# Patient Record
Sex: MALE | Race: WHITE | URBAN - METROPOLITAN AREA
[De-identification: names, ages, dates, MRNs, and addresses within clinical notes are randomized per-mention and may not be internally consistent; named-entity substitution may affect disease eponyms.]

---

## 2021-06-21 ENCOUNTER — OFFICE VISIT (OUTPATIENT)
Dept: ORTHOPEDIC SURGERY | Age: 66
End: 2021-06-21
Payer: COMMERCIAL

## 2021-06-21 VITALS — BODY MASS INDEX: 34.36 KG/M2 | HEIGHT: 70 IN | WEIGHT: 240 LBS

## 2021-06-21 DIAGNOSIS — S81.802A WOUND OF LEFT LOWER EXTREMITY, INITIAL ENCOUNTER: ICD-10-CM

## 2021-06-21 DIAGNOSIS — Z89.512 HX OF BKA, LEFT (HCC): Primary | ICD-10-CM

## 2021-06-21 PROCEDURE — 99205 OFFICE O/P NEW HI 60 MIN: CPT | Performed by: PHYSICAL MEDICINE & REHABILITATION

## 2021-06-21 RX ORDER — INSULIN GLARGINE 100 [IU]/ML
INJECTION, SOLUTION SUBCUTANEOUS NIGHTLY
COMMUNITY
Start: 2021-06-04

## 2021-06-21 RX ORDER — GABAPENTIN 600 MG/1
100 TABLET ORAL 2 TIMES DAILY
COMMUNITY
Start: 2021-06-05

## 2021-06-21 RX ORDER — ASPIRIN 81 MG/1
81 TABLET ORAL DAILY
COMMUNITY

## 2021-06-21 RX ORDER — ATORVASTATIN CALCIUM 80 MG/1
TABLET, FILM COATED ORAL
COMMUNITY
Start: 2020-06-30

## 2021-06-21 RX ORDER — CASTOR OIL AND BALSAM, PERU 788; 87 MG/G; MG/G
OINTMENT TOPICAL 2 TIMES DAILY
Qty: 60 G | Refills: 2 | Status: SHIPPED | OUTPATIENT
Start: 2021-06-21

## 2021-06-21 RX ORDER — DIPHENHYDRAMINE HYDROCHLORIDE 25 MG/1
1 TABLET ORAL DAILY
COMMUNITY

## 2021-06-21 RX ORDER — LISINOPRIL AND HYDROCHLOROTHIAZIDE 12.5; 1 MG/1; MG/1
2 TABLET ORAL DAILY
COMMUNITY
Start: 2021-06-16

## 2021-06-21 NOTE — PROGRESS NOTES
[facility]) Physical Medicine and Rehabilitation   Outpatient Progress Note  Arsh Angel. DO Karen, MPH    Patient Name: Jaiden Lentz MRN: [id number]   Age: 76 y.o. YOB: 1955   Sex: male      CHIEF COMPLAINT   Left transtibial amputation      HISTORY OF PRESENT ILLNESS   Jaiden Lentz is a 76 y.o. male with a past medical history of DM2, MI, obesity and OA who comes in today for evaluation of a left BKA. He had surgery on 5/17/21 due to infection. Since this time he has been working in therapy and attempting to perform ADLs at home but requires a new prosthetic. Previous to his BKA he was at a K3 level and able to cook, clean and perform ADLs by himself. He also enjoyed walking but had to use a cane on occasion. Currently he is able to cook and clean and perform independent bathing/toileting but is using a wheelchair at home. He would like to start ambulating and getting around his home better to continue to work in his basement. At this time He just wants to ambulate and continue to work in therapy to get back to normal life. He did state that he fell today and has some blood drainage from his wound. PAST MEDICAL HISTORY    No past medical history on file. PAST SURGICAL HISTORY   No past surgical history on file. MEDICATIONS     Current Outpatient Medications   Medication Sig Dispense Refill    gabapentin (NEURONTIN) 600 MG tablet 100 mg 2 times daily.       Biotin 5 MG CAPS Take 1 capsule by mouth daily      lisinopril-hydroCHLOROthiazide (PRINZIDE;ZESTORETIC) 10-12.5 MG per tablet Take 2 tablets by mouth daily      atorvastatin (LIPITOR) 80 MG tablet TAKE 1 TABLET(80 MG) BY MOUTH AT BEDTIME      aspirin 81 MG EC tablet Take 81 mg by mouth daily      insulin glargine (LANTUS) 100 UNIT/ML injection vial Inject into the skin nightly      insulin lispro (HUMALOG) 100 UNIT/ML injection vial Inject 25-50 Units into the skin every morning (before breakfast)       No current facility-administered medications for this visit. ALLERGIES   No Known Allergies    FAMILY HISTORY   No family history on file. SOCIAL HISTORY     Social History     Socioeconomic History    Marital status:      Spouse name: Not on file    Number of children: Not on file    Years of education: Not on file    Highest education level: Not on file   Occupational History    Not on file   Tobacco Use    Smoking status: Not on file   Substance and Sexual Activity    Alcohol use: Not on file    Drug use: Not on file    Sexual activity: Not on file   Other Topics Concern    Not on file   Social History Narrative    Not on file     Social Determinants of Health     Financial Resource Strain:     Difficulty of Paying Living Expenses:    Food Insecurity:     Worried About Running Out of Food in the Last Year:     [address] in the Last Year:    Transportation Needs:     Lack of Transportation (Medical):      Lack of Transportation (Non-Medical):    Physical Activity:     Days of Exercise per Week:     Minutes of Exercise per Session:    Stress:     Feeling of Stress :    Social Connections:     Frequency of Communication with Friends and Family:     Frequency of Social Gatherings with Friends and Family:     Attends Anabaptist Services:     Active Member of Clubs or Organizations:     Attends Club or Organization Meetings:     Marital Status:    Intimate Partner Violence:     Fear of Current or Ex-Partner:     Emotionally Abused:     Physically Abused:     Sexually Abused:        REVIEW OF SYSTEMS   General: no fever, chills, night sweats, anorexia, malaise, fatigue, or weight change  Hematologic:  no unexplained bleeding or bruising  HEENT:   no nasal congestion, rhinorrhea, sore throat, or facial pain  Respiratory:  no cough, dyspnea, or chest pain  Cardiovascular:  no angina, RICE, PND, orthopnea, dependent edema, or palpitations  Gastrointestinal:  no nausea, vomiting, diarrhea, constipation, or abdominal pain  Genitourinary:  no urinary urgency, frequency, dysuria, or hematuria  Musculoskeletal: see HPI  Endocrine:  no heat or cold intolerance and no polyphagia, polydipsia, or polyuria  Skin:  no skin eruptions or changing lesions  Neurologic:  no focal weakness, numbness/tingling, tremor, or severe headache. See HPI. See HPI for pertinent positives. PHYSICAL EXAM   Vital Signs:   Vitals:    06/21/21 1235   Weight: 240 lb (108.9 kg)   Height: 5' 10\" (1.778 m)       General appearance: healthy, alert, no distress  Skin: Skin color, texture, turgor normal. No rashes or lesions  HEENT: atraumatic, normocephalic. PERRL  Respiratory: Unlabored breathing  Lymphatic: No adenopathy   Neuro: Alert and oriented, normal distal sensation, normal bilateral DTRs  Vascular: Normal distal capillary and distal pulses  Muskuloskeletal Exam: Right Knee Exam   Right knee exam is normal.    Muscle Strength   The patient has normal right knee strength. Other   Swelling: mild      Left Knee Exam     Muscle Strength   The patient has normal left knee strength. Other   Erythema: absent  Swelling: moderate    Comments:  BKA healing with lateral open drainage due to fall earlier in the day. 4mm in length in incision line. IMPRESSION     1. Hx of BKA, left ([address]         PLAN   I had a lengthy discussion with patient today regarding diagnosis and treatment options and recommendations. LEFT BKA    1. K2 level- able to ambulate at a fixed rolando. - requires gait training and a walker in PT/OT    2. Multiaxial rotation unit foot. - able to ambulate on uneven surfaces    3. Utralight materials minimize energy expenditure     4. Flexible inner socket with external frame- volume fluctuations in his residual limb. 5. Endoskeletal definitive prosthesis with an anatomical total surface bearing socket, suction with sealing sleeve suspension and alps sleeve. Suction pyramid valve. [facility] foot which has K2 level use. New prothesis as he is a new amputee. 6. Wound care- triple antibiotic ointment- change wound dressing BID. FOLLOWUP     Return in about 3 months (around 9/21/2021). No orders of the defined types were placed in this encounter. No orders of the defined types were placed in this encounter.       Patient was instructed on appropriate use of braces, participation in home exercise programs, healthy lifestyle choices and weight loss as appropriate     Alan Jones, DO

## 2021-06-28 ENCOUNTER — TELEPHONE (OUTPATIENT)
Dept: ORTHOPEDIC SURGERY | Age: 66
End: 2021-06-28

## 2021-06-28 NOTE — TELEPHONE ENCOUNTER
General Question     Subject:  LEFT LEG   Patient and /or Facility Request: PATIENT'S WIFE STATES SHE DOESN'T KNOW WHICH PRESCRIPTION'S SHE SHOULD BE USING BECAUSE PATIENT'S LEG IS GETTING [facility] NEEDS ASSISTANCE  Contact Number: [phone] [address] [facility]

## 2021-06-29 NOTE — TELEPHONE ENCOUNTER
Left message for patient at home to call his vascular surgeon per Dr. Rodrigo Mckeon for an evaluation on his leg.

## 2022-03-09 ENCOUNTER — OFFICE VISIT (OUTPATIENT)
Dept: ORTHOPEDIC SURGERY | Age: 67
End: 2022-03-09
Payer: COMMERCIAL

## 2022-03-09 VITALS — HEIGHT: 70 IN | BODY MASS INDEX: 35.79 KG/M2 | WEIGHT: 250 LBS

## 2022-03-09 DIAGNOSIS — Z89.512 HX OF BKA, LEFT (HCC): Primary | ICD-10-CM

## 2022-03-09 PROCEDURE — 99204 OFFICE O/P NEW MOD 45 MIN: CPT | Performed by: PHYSICAL MEDICINE & REHABILITATION

## 2022-03-09 NOTE — PROGRESS NOTES
[facility]) Physical Medicine and Rehabilitation  Outpatient Progress Note  Kwasi Bentley. DO Karen    Patient Name: Bethany Castano MRN: [id number]   Age: 78 y.o. YOB: 1955   Sex: male      CHIEF COMPLAINT   Left transtibial amputation     HISTORY OF PRESENT ILLNESS   Bethany Castano is a 78 y.o. male with a past medical history of DM2, MI, obesity and OA who comes in today for evaluation of a left BKA. He had surgery on 5/17/21 due to infection. He was last seen in the office for left K2 BKA prosthetic orders and had a new prosthetic made. This prosthetic did not fit very well and while he was testing it in the office he had a skin tear which then lead to a long infectious process. He has had 2 surgeries in the past 6 months to close and clear infection. Wound has now healed and he is looking to get a new prosthetic. Today he follows up because he needs new orders for a left BKA prosthetic. He is going with a new company to fabricate prosthetic- [employer] [address] [place]. Past note- Previous to his BKA he was at a K3 level and able to cook, clean and perform ADLs by himself. He also enjoyed walking but had to use a cane on occasion. Currently he is able to cook and clean and perform independent bathing/toileting but is using a wheelchair at home. He would like to start ambulating and getting around his home better to continue to work in his basement. At this time He just wants to ambulate and continue to work in therapy to get back to normal life. PAST MEDICAL HISTORY    No past medical history on file. PAST SURGICAL HISTORY   No past surgical history on file. MEDICATIONS     Current Outpatient Medications   Medication Sig Dispense Refill    gabapentin (NEURONTIN) 600 MG tablet 100 mg 2 times daily.       Biotin 5 MG CAPS Take 1 capsule by mouth daily      lisinopril-hydroCHLOROthiazide (PRINZIDE;ZESTORETIC) 10-12.5 MG per tablet Take 2 tablets by mouth daily      atorvastatin Club or Organization Meetings: Not on file    Marital Status: Not on file   Intimate Partner Violence:     Fear of Current or Ex-Partner: Not on file    Emotionally Abused: Not on file    Physically Abused: Not on file    Sexually Abused: Not on file   Housing Stability:     Unable to Pay for Housing in the Last Year: Not on file    Number of Dontae in the Last Year: Not on file    Unstable Housing in the Last Year: Not on file       REVIEW OF SYSTEMS   General: no fever, chills, night sweats, anorexia, malaise, fatigue, or weight change  Hematologic:  no unexplained bleeding or bruising  HEENT:   no nasal congestion, rhinorrhea, sore throat, or facial pain  Respiratory:  no cough, dyspnea, or chest pain  Cardiovascular:  no angina, RICE, PND, orthopnea, dependent edema, or palpitations  Gastrointestinal:  no nausea, vomiting, diarrhea, constipation, or abdominal pain  Genitourinary:  no urinary urgency, frequency, dysuria, or hematuria  Musculoskeletal: see HPI  Endocrine:  no heat or cold intolerance and no polyphagia, polydipsia, or polyuria  Skin:  no skin eruptions or changing lesions  Neurologic:  no focal weakness, numbness/tingling, tremor, or severe headache. See HPI. See HPI for pertinent positives. PHYSICAL EXAM   Vital Signs:   Vitals:    03/09/22 1300   Weight: 250 lb (113.4 kg)   Height: 5' 10\" (1.778 m)       General appearance: healthy, alert, no distress  Skin: Skin color, texture, turgor normal. No rashes or lesions  HEENT: atraumatic, normocephalic.  PERRL  Respiratory: Unlabored breathing  Lymphatic: No adenopathy   Neuro: Alert and oriented, normal distal sensation, normal bilateral DTRs  Vascular: Normal distal capillary and distal pulses  Muskuloskeletal Exam: Right Knee Exam   Right knee exam is normal.     Muscle Strength   The patient has normal right knee strength.     Other   Swelling: mild        Left Knee Exam      Muscle Strength   The patient has normal left knee strength.     Other   Erythema: absent  Swelling: moderate     Comments:  BKA LEFT- no open wounds- healing well. IMPRESSION     1. Hx of BKA, left ([address]         PLAN   I had a lengthy discussion with patient today regarding diagnosis and treatment options and recommendations. 1.LEFT BKA- requires new prosthesis- Past attempts failed ( currently does not have a prosthesis) and requires new prosthesis due to wound care issues and multiple surgeries. TT definitive prosthesis left- motivated to continue to be able to ambulate for his independent lifestyle and to maintain his health. Has the cognitive ability to maintain and don his prosthesis.      1. K2 level- able to ambulate at a fixed rolando. - requires gait training and a walker in PT/OT     2. Multiaxial rotation unit foot. - able to ambulate on uneven surfaces     3. Utralight materials minimize energy expenditure      4. Flexible inner socket with external frame- volume fluctuations in his residual limb.      5. Endoskeletal definitive prosthesis with an anatomical total surface bearing socket, suction with sealing sleeve suspension and alps sleeve. Suction pyramid valve. [facility] foot which has K2 level use. New prothesis as he is a new amputee. 6. Prior to amputation he was very active- needs dynamic response foot. - large circumference distally than proximally on residual limb. Likely will be a K3 Ambulator in the near future. FOLLOWUP     Return in about 3 months (around 6/9/2022). No orders of the defined types were placed in this encounter. No orders of the defined types were placed in this encounter.       Patient was instructed on appropriate use of braces, participation in home exercise programs, healthy lifestyle choices and weight loss as appropriate     Alan Jones DO

## 2022-10-17 ENCOUNTER — OFFICE VISIT (OUTPATIENT)
Dept: ORTHOPEDIC SURGERY | Age: 67
End: 2022-10-17
Payer: COMMERCIAL

## 2022-10-17 VITALS — BODY MASS INDEX: 35.79 KG/M2 | HEIGHT: 70 IN | WEIGHT: 250 LBS

## 2022-10-17 DIAGNOSIS — Z89.512 HX OF BKA, LEFT (HCC): Primary | ICD-10-CM

## 2022-10-17 PROCEDURE — 1123F ACP DISCUSS/DSCN MKR DOCD: CPT | Performed by: PHYSICAL MEDICINE & REHABILITATION

## 2022-10-17 PROCEDURE — 99214 OFFICE O/P EST MOD 30 MIN: CPT | Performed by: PHYSICAL MEDICINE & REHABILITATION

## 2022-10-17 NOTE — PROGRESS NOTES
[facility]) Physical Medicine and Rehabilitation  Outpatient Progress Note  Yonny Jones DO    Patient Name: Fabiana Erickson MRN: [id number]   Age: 75 y.o. YOB: 1955   Sex: male      [address] Complaint   Patient presents with    Follow-up     Current prosthetic leg is too large and is rubbing his leg. [facility] [place] [facility]        HISTORY OF PRESENT ILLNESS   Fabiana Erickson is a 75 y.o. male with a past medical history of DM2, MI, obesity and OA who comes in today for evaluation of a left BKA. He had surgery on 5/17/21 due to infection. Today he follows up because he needs a new socket for his prosthesis. He is using 15 ply socks and currently is having wound issues due to the pistoning in the socket. He also states he is feeling less comfortable when he wears his socket for long periods of time. PAST MEDICAL HISTORY    No past medical history on file. PAST SURGICAL HISTORY   No past surgical history on file. MEDICATIONS     Current Outpatient Medications   Medication Sig Dispense Refill    gabapentin (NEURONTIN) 600 MG tablet 100 mg 2 times daily. Biotin 5 MG CAPS Take 1 capsule by mouth daily      lisinopril-hydroCHLOROthiazide (PRINZIDE;ZESTORETIC) 10-12.5 MG per tablet Take 2 tablets by mouth daily      atorvastatin (LIPITOR) 80 MG tablet TAKE 1 TABLET(80 MG) BY MOUTH AT BEDTIME      aspirin 81 MG EC tablet Take 81 mg by mouth daily      insulin glargine (LANTUS) 100 UNIT/ML injection vial Inject into the skin nightly      insulin lispro (HUMALOG) 100 UNIT/ML injection vial Inject 25-50 Units into the skin every morning (before breakfast)      Balsam [place]-Castor Oil (VENELEX) OINT ointment Apply topically 2 times daily 60 g 2     No current facility-administered medications for this visit. ALLERGIES   No Known Allergies    FAMILY HISTORY   No family history on file.     SOCIAL HISTORY     Social History     Socioeconomic History    Marital status:        REVIEW OF SYSTEMS   General: no fever, chills, night sweats, anorexia, malaise, fatigue, or weight change  Hematologic:  no unexplained bleeding or bruising  HEENT:   no nasal congestion, rhinorrhea, sore throat, or facial pain  Respiratory:  no cough, dyspnea, or chest pain  Cardiovascular:  no angina, RICE, PND, orthopnea, dependent edema, or palpitations  Gastrointestinal:  no nausea, vomiting, diarrhea, constipation, or abdominal pain  Genitourinary:  no urinary urgency, frequency, dysuria, or hematuria  Musculoskeletal: see HPI  Endocrine:  no heat or cold intolerance and no polyphagia, polydipsia, or polyuria  Skin:  no skin eruptions or changing lesions  Neurologic:  no focal weakness, numbness/tingling, tremor, or severe headache. See HPI. See HPI for pertinent positives. PHYSICAL EXAM   Vital Signs:   Vitals:    10/17/22 1302   Weight: 250 lb (113.4 kg)   Height: 5' 10\" (1.778 m)       General appearance: healthy, alert, no distress  Skin: Skin color, texture, turgor normal. No rashes or lesions  HEENT: atraumatic, normocephalic. PERRL  Respiratory: Unlabored breathing  Lymphatic: No adenopathy   Neuro: Alert and oriented, normal distal sensation, normal bilateral DTRs  Vascular: Normal distal capillary and distal pulses  Muskuloskeletal Exam: Right Knee Exam   Right knee exam is normal.     Muscle Strength   The patient has normal right knee strength. Other   Swelling: mild        Left Knee Exam      Muscle Strength   The patient has normal left knee strength. Other   Erythema: absent  Swelling: moderate     Comments:  BKA LEFT- very minor superficial wounds- healing well. due to residual limb movement in the socket        IMPRESSION     1. Hx of BKA, left ([address]         PLAN   I had a lengthy discussion with patient today regarding diagnosis and treatment options and recommendations. 1.New LEFT BKA socket- requires new socket due to volume changes.  He currently is using 15 ply sockets in the morning and sometimes 18 ply sockets at night. He is having some wound issues due to pistoning in the socket. He will be using the socket for ambulation. HE has the cognitive ability to don and maintain a prosthesis. 1. K3 level- able to ambulate at a variable rolando. - unlimited community Ambulator        2. Flexible inner socket with external frame- volume fluctuations in his residual limb. - suction system     5. Endoskeletal definitive prosthesis with an anatomical total surface bearing socket, suction with sealing sleeve suspension and alps sleeve. Suction pyramid valve. [facility] foot which has K3 level use. New socket required due to volume loss of residual limb. FOLLOWUP     Return in about 6 months (around 4/17/2023). No orders of the defined types were placed in this encounter. No orders of the defined types were placed in this encounter.       Patient was instructed on appropriate use of braces, participation in home exercise programs, healthy lifestyle choices and weight loss as appropriate     Alan Jones, DO

## 2022-10-19 ENCOUNTER — TELEPHONE (OUTPATIENT)
Dept: ORTHOPEDIC SURGERY | Age: 67
End: 2022-10-19

## 2022-10-19 NOTE — TELEPHONE ENCOUNTER
Medical Facility Question     [facility]: [facility]  Contact Name: Debra Pinon Number: [phone]  Request or Information: Need most recent doctor notes./  FAX: [phone]

## 2023-04-18 ENCOUNTER — TELEPHONE (OUTPATIENT)
Dept: ORTHOPEDIC SURGERY | Age: 68
End: 2023-04-18

## 2023-04-18 NOTE — TELEPHONE ENCOUNTER
Other SWAPNA WASHINGTON WITH [facility] IS CALLING TO SEE IF DR MONTE WOULD SEE THIS PATIENT FOR FOLLOW UP.  [facility] [phone]

## 2023-04-18 NOTE — TELEPHONE ENCOUNTER
Spoke to Delfin Lopez. Unfortunately Dr. Krishan Ruelas is not seeing any more outpatients at this time. Yes, see eMAR